# Patient Record
Sex: MALE | Race: BLACK OR AFRICAN AMERICAN
[De-identification: names, ages, dates, MRNs, and addresses within clinical notes are randomized per-mention and may not be internally consistent; named-entity substitution may affect disease eponyms.]

---

## 2021-01-07 ENCOUNTER — HOSPITAL ENCOUNTER (OUTPATIENT)
Dept: HOSPITAL 92 - TBSIIMAG | Age: 26
Discharge: HOME | End: 2021-01-07
Attending: ORTHOPAEDIC SURGERY
Payer: COMMERCIAL

## 2021-01-07 DIAGNOSIS — M25.522: Primary | ICD-10-CM

## 2021-01-07 NOTE — MRI
EXAM:

MRI left elbow



PROVIDED CLINICAL HISTORY:

Pain



COMPARISON:

None



FINDINGS:

The biceps, brachialis and triceps tendon insertions appear intact.



The common flexor and common extensor tendon origins appear intact.



The medial and lateral elbow ligaments appear intact.



No focal articular cartilage defect is apparent. The amount of fluid within the elbow joint appears p
hysiologic. Alignment appears anatomic.



No focal concerning regional marrow or muscular signal abnormality is evident.



The courses of the regional major neurovascular structures appear unremarkable.



IMPRESSION:

No evidence for internal derangement.



Reported By: Artur Joya 

Electronically Signed:  1/7/2021 9:09 AM